# Patient Record
Sex: MALE | Race: WHITE | NOT HISPANIC OR LATINO | Employment: FULL TIME | URBAN - METROPOLITAN AREA
[De-identification: names, ages, dates, MRNs, and addresses within clinical notes are randomized per-mention and may not be internally consistent; named-entity substitution may affect disease eponyms.]

---

## 2020-11-20 ENCOUNTER — OFFICE VISIT (OUTPATIENT)
Dept: OBGYN CLINIC | Facility: CLINIC | Age: 45
End: 2020-11-20
Payer: COMMERCIAL

## 2020-11-20 VITALS
BODY MASS INDEX: 35.78 KG/M2 | WEIGHT: 270 LBS | TEMPERATURE: 98.6 F | HEART RATE: 76 BPM | HEIGHT: 73 IN | SYSTOLIC BLOOD PRESSURE: 132 MMHG | DIASTOLIC BLOOD PRESSURE: 84 MMHG

## 2020-11-20 DIAGNOSIS — G56.01 CARPAL TUNNEL SYNDROME ON RIGHT: ICD-10-CM

## 2020-11-20 DIAGNOSIS — S46.911A STRAIN OF RIGHT SHOULDER, INITIAL ENCOUNTER: Primary | ICD-10-CM

## 2020-11-20 DIAGNOSIS — M25.511 RIGHT SHOULDER PAIN, UNSPECIFIED CHRONICITY: ICD-10-CM

## 2020-11-20 PROCEDURE — 99203 OFFICE O/P NEW LOW 30 MIN: CPT | Performed by: ORTHOPAEDIC SURGERY

## 2020-11-20 RX ORDER — METOCLOPRAMIDE 10 MG/1
TABLET ORAL
COMMUNITY
Start: 2020-06-30

## 2020-11-20 RX ORDER — NAPROXEN 500 MG/1
500 TABLET ORAL 2 TIMES DAILY WITH MEALS
Qty: 28 TABLET | Refills: 0 | Status: SHIPPED | OUTPATIENT
Start: 2020-11-20

## 2020-11-20 RX ORDER — AMITRIPTYLINE HYDROCHLORIDE 10 MG/1
TABLET, FILM COATED ORAL
COMMUNITY
Start: 2020-06-30

## 2020-11-20 RX ORDER — TRAMADOL HYDROCHLORIDE 50 MG/1
50 TABLET ORAL EVERY 4 HOURS PRN
COMMUNITY
Start: 2020-11-11

## 2021-11-07 PROCEDURE — 93005 ELECTROCARDIOGRAM TRACING: CPT

## 2021-11-07 PROCEDURE — 99284 EMERGENCY DEPT VISIT MOD MDM: CPT

## 2021-11-08 ENCOUNTER — HOSPITAL ENCOUNTER (EMERGENCY)
Facility: HOSPITAL | Age: 46
Discharge: HOME | End: 2021-11-08
Attending: EMERGENCY MEDICINE
Payer: COMMERCIAL

## 2021-11-08 ENCOUNTER — APPOINTMENT (EMERGENCY)
Dept: CT IMAGING | Facility: HOSPITAL | Age: 46
End: 2021-11-08
Payer: COMMERCIAL

## 2021-11-08 ENCOUNTER — APPOINTMENT (EMERGENCY)
Dept: RADIOLOGY | Facility: HOSPITAL | Age: 46
End: 2021-11-08
Payer: COMMERCIAL

## 2021-11-08 VITALS
TEMPERATURE: 98.4 F | SYSTOLIC BLOOD PRESSURE: 126 MMHG | WEIGHT: 256 LBS | DIASTOLIC BLOOD PRESSURE: 60 MMHG | BODY MASS INDEX: 34.67 KG/M2 | OXYGEN SATURATION: 94 % | HEART RATE: 72 BPM | RESPIRATION RATE: 20 BRPM | HEIGHT: 72 IN

## 2021-11-08 DIAGNOSIS — B34.9 VIRAL SYNDROME: ICD-10-CM

## 2021-11-08 DIAGNOSIS — E87.6 HYPOKALEMIA: ICD-10-CM

## 2021-11-08 DIAGNOSIS — R11.10 VOMITING: Primary | ICD-10-CM

## 2021-11-08 LAB
ALBUMIN SERPL BCP-MCNC: 3.4 G/DL (ref 3.5–5)
ALP SERPL-CCNC: 64 U/L (ref 46–116)
ALT SERPL W P-5'-P-CCNC: 31 U/L (ref 12–78)
ANION GAP SERPL CALCULATED.3IONS-SCNC: 7 MMOL/L (ref 4–13)
ANION GAP SERPL CALCULATED.3IONS-SCNC: 9 MMOL/L (ref 4–13)
APTT PPP: 28 SECONDS (ref 23–37)
AST SERPL W P-5'-P-CCNC: 12 U/L (ref 5–45)
BASOPHILS # BLD AUTO: 0.01 THOUSANDS/ΜL (ref 0–0.1)
BASOPHILS NFR BLD AUTO: 0 % (ref 0–1)
BILIRUB SERPL-MCNC: 1.1 MG/DL (ref 0.2–1)
BUN SERPL-MCNC: 17 MG/DL (ref 5–25)
BUN SERPL-MCNC: 18 MG/DL (ref 5–25)
CALCIUM ALBUM COR SERPL-MCNC: 8.8 MG/DL (ref 8.3–10.1)
CALCIUM SERPL-MCNC: 7.2 MG/DL (ref 8.3–10.1)
CALCIUM SERPL-MCNC: 8.3 MG/DL (ref 8.3–10.1)
CHLORIDE SERPL-SCNC: 100 MMOL/L (ref 100–108)
CHLORIDE SERPL-SCNC: 96 MMOL/L (ref 100–108)
CK SERPL-CCNC: 79 U/L (ref 39–308)
CO2 SERPL-SCNC: 28 MMOL/L (ref 21–32)
CO2 SERPL-SCNC: 30 MMOL/L (ref 21–32)
CREAT SERPL-MCNC: 0.88 MG/DL (ref 0.6–1.3)
CREAT SERPL-MCNC: 0.95 MG/DL (ref 0.6–1.3)
EOSINOPHIL # BLD AUTO: 0.02 THOUSAND/ΜL (ref 0–0.61)
EOSINOPHIL NFR BLD AUTO: 0 % (ref 0–6)
ERYTHROCYTE [DISTWIDTH] IN BLOOD BY AUTOMATED COUNT: 12.8 % (ref 11.6–15.1)
FLUAV RNA RESP QL NAA+PROBE: NEGATIVE
FLUBV RNA RESP QL NAA+PROBE: NEGATIVE
GFR SERPL CREATININE-BSD FRML MDRD: 102 ML/MIN/1.73SQ M
GFR SERPL CREATININE-BSD FRML MDRD: 94 ML/MIN/1.73SQ M
GLUCOSE SERPL-MCNC: 101 MG/DL (ref 65–140)
GLUCOSE SERPL-MCNC: 91 MG/DL (ref 65–140)
HCT VFR BLD AUTO: 43.5 % (ref 36.5–49.3)
HGB BLD-MCNC: 14.8 G/DL (ref 12–17)
IMM GRANULOCYTES # BLD AUTO: 0.01 THOUSAND/UL (ref 0–0.2)
IMM GRANULOCYTES NFR BLD AUTO: 0 % (ref 0–2)
INR PPP: 1.11 (ref 0.84–1.19)
LIPASE SERPL-CCNC: 66 U/L (ref 73–393)
LYMPHOCYTES # BLD AUTO: 1.29 THOUSANDS/ΜL (ref 0.6–4.47)
LYMPHOCYTES NFR BLD AUTO: 15 % (ref 14–44)
MCH RBC QN AUTO: 30.7 PG (ref 26.8–34.3)
MCHC RBC AUTO-ENTMCNC: 34 G/DL (ref 31.4–37.4)
MCV RBC AUTO: 90 FL (ref 82–98)
MONOCYTES # BLD AUTO: 0.62 THOUSAND/ΜL (ref 0.17–1.22)
MONOCYTES NFR BLD AUTO: 7 % (ref 4–12)
NEUTROPHILS # BLD AUTO: 6.79 THOUSANDS/ΜL (ref 1.85–7.62)
NEUTS SEG NFR BLD AUTO: 78 % (ref 43–75)
PLATELET # BLD AUTO: 256 THOUSANDS/UL (ref 149–390)
PMV BLD AUTO: 9.7 FL (ref 8.9–12.7)
POTASSIUM SERPL-SCNC: 2.5 MMOL/L (ref 3.5–5.3)
POTASSIUM SERPL-SCNC: 3.8 MMOL/L (ref 3.5–5.3)
PROT SERPL-MCNC: 7.8 G/DL (ref 6.4–8.2)
PROTHROMBIN TIME: 14.2 SECONDS (ref 11.6–14.5)
RBC # BLD AUTO: 4.82 MILLION/UL (ref 3.88–5.62)
RSV RNA RESP QL NAA+PROBE: NEGATIVE
SARS-COV-2 RNA RESP QL NAA+PROBE: NEGATIVE
SODIUM SERPL-SCNC: 135 MMOL/L (ref 136–145)
SODIUM SERPL-SCNC: 135 MMOL/L (ref 136–145)
TROPONIN I SERPL-MCNC: <0.02 NG/ML
WBC # BLD AUTO: 8.74 THOUSAND/UL (ref 4.31–10.16)

## 2021-11-08 PROCEDURE — 80053 COMPREHEN METABOLIC PANEL: CPT | Performed by: EMERGENCY MEDICINE

## 2021-11-08 PROCEDURE — 82550 ASSAY OF CK (CPK): CPT | Performed by: EMERGENCY MEDICINE

## 2021-11-08 PROCEDURE — 99285 EMERGENCY DEPT VISIT HI MDM: CPT | Performed by: EMERGENCY MEDICINE

## 2021-11-08 PROCEDURE — 0241U HB NFCT DS VIR RESP RNA 4 TRGT: CPT | Performed by: EMERGENCY MEDICINE

## 2021-11-08 PROCEDURE — 85025 COMPLETE CBC W/AUTO DIFF WBC: CPT | Performed by: EMERGENCY MEDICINE

## 2021-11-08 PROCEDURE — 96366 THER/PROPH/DIAG IV INF ADDON: CPT

## 2021-11-08 PROCEDURE — 96375 TX/PRO/DX INJ NEW DRUG ADDON: CPT

## 2021-11-08 PROCEDURE — 85730 THROMBOPLASTIN TIME PARTIAL: CPT | Performed by: EMERGENCY MEDICINE

## 2021-11-08 PROCEDURE — 93005 ELECTROCARDIOGRAM TRACING: CPT

## 2021-11-08 PROCEDURE — 74177 CT ABD & PELVIS W/CONTRAST: CPT

## 2021-11-08 PROCEDURE — 83690 ASSAY OF LIPASE: CPT | Performed by: EMERGENCY MEDICINE

## 2021-11-08 PROCEDURE — 96365 THER/PROPH/DIAG IV INF INIT: CPT

## 2021-11-08 PROCEDURE — 96361 HYDRATE IV INFUSION ADD-ON: CPT

## 2021-11-08 PROCEDURE — 36415 COLL VENOUS BLD VENIPUNCTURE: CPT | Performed by: EMERGENCY MEDICINE

## 2021-11-08 PROCEDURE — 80048 BASIC METABOLIC PNL TOTAL CA: CPT | Performed by: EMERGENCY MEDICINE

## 2021-11-08 PROCEDURE — 96368 THER/DIAG CONCURRENT INF: CPT

## 2021-11-08 PROCEDURE — 71045 X-RAY EXAM CHEST 1 VIEW: CPT

## 2021-11-08 PROCEDURE — 85610 PROTHROMBIN TIME: CPT | Performed by: EMERGENCY MEDICINE

## 2021-11-08 PROCEDURE — 84484 ASSAY OF TROPONIN QUANT: CPT | Performed by: EMERGENCY MEDICINE

## 2021-11-08 RX ORDER — MAGNESIUM SULFATE HEPTAHYDRATE 40 MG/ML
2 INJECTION, SOLUTION INTRAVENOUS ONCE
Status: COMPLETED | OUTPATIENT
Start: 2021-11-08 | End: 2021-11-08

## 2021-11-08 RX ORDER — POTASSIUM CHLORIDE 14.9 MG/ML
20 INJECTION INTRAVENOUS ONCE
Status: COMPLETED | OUTPATIENT
Start: 2021-11-08 | End: 2021-11-08

## 2021-11-08 RX ORDER — KETOROLAC TROMETHAMINE 30 MG/ML
15 INJECTION, SOLUTION INTRAMUSCULAR; INTRAVENOUS ONCE
Status: COMPLETED | OUTPATIENT
Start: 2021-11-08 | End: 2021-11-08

## 2021-11-08 RX ORDER — ONDANSETRON 2 MG/ML
4 INJECTION INTRAMUSCULAR; INTRAVENOUS ONCE
Status: COMPLETED | OUTPATIENT
Start: 2021-11-08 | End: 2021-11-08

## 2021-11-08 RX ORDER — POTASSIUM CHLORIDE 20 MEQ/1
40 TABLET, EXTENDED RELEASE ORAL ONCE
Status: COMPLETED | OUTPATIENT
Start: 2021-11-08 | End: 2021-11-08

## 2021-11-08 RX ORDER — ONDANSETRON 4 MG/1
4 TABLET, ORALLY DISINTEGRATING ORAL EVERY 6 HOURS PRN
Qty: 20 TABLET | Refills: 0 | Status: SHIPPED | OUTPATIENT
Start: 2021-11-08

## 2021-11-08 RX ORDER — MAGNESIUM HYDROXIDE/ALUMINUM HYDROXICE/SIMETHICONE 120; 1200; 1200 MG/30ML; MG/30ML; MG/30ML
30 SUSPENSION ORAL ONCE
Status: COMPLETED | OUTPATIENT
Start: 2021-11-08 | End: 2021-11-08

## 2021-11-08 RX ADMIN — KETOROLAC TROMETHAMINE 15 MG: 30 INJECTION, SOLUTION INTRAMUSCULAR; INTRAVENOUS at 00:36

## 2021-11-08 RX ADMIN — MAGNESIUM SULFATE HEPTAHYDRATE 2 G: 40 INJECTION, SOLUTION INTRAVENOUS at 02:11

## 2021-11-08 RX ADMIN — IOHEXOL 100 ML: 350 INJECTION, SOLUTION INTRAVENOUS at 03:07

## 2021-11-08 RX ADMIN — ONDANSETRON 4 MG: 2 INJECTION INTRAMUSCULAR; INTRAVENOUS at 00:35

## 2021-11-08 RX ADMIN — SODIUM CHLORIDE 1000 ML: 0.9 INJECTION, SOLUTION INTRAVENOUS at 00:25

## 2021-11-08 RX ADMIN — ALUMINUM HYDROXIDE, MAGNESIUM HYDROXIDE, AND SIMETHICONE 30 ML: 200; 200; 20 SUSPENSION ORAL at 00:35

## 2021-11-08 RX ADMIN — POTASSIUM CHLORIDE 40 MEQ: 1500 TABLET, EXTENDED RELEASE ORAL at 02:15

## 2021-11-08 RX ADMIN — POTASSIUM CHLORIDE 20 MEQ: 14.9 INJECTION, SOLUTION INTRAVENOUS at 02:15

## 2021-11-08 RX ADMIN — FAMOTIDINE 20 MG: 10 INJECTION INTRAVENOUS at 00:36

## 2021-11-12 LAB
ATRIAL RATE: 100 BPM
ATRIAL RATE: 87 BPM
P AXIS: 49 DEGREES
P AXIS: 53 DEGREES
PR INTERVAL: 134 MS
PR INTERVAL: 144 MS
QRS AXIS: 44 DEGREES
QRS AXIS: 46 DEGREES
QRSD INTERVAL: 82 MS
QRSD INTERVAL: 84 MS
QT INTERVAL: 330 MS
QT INTERVAL: 350 MS
QTC INTERVAL: 421 MS
QTC INTERVAL: 425 MS
T WAVE AXIS: 16 DEGREES
T WAVE AXIS: 4 DEGREES
VENTRICULAR RATE: 100 BPM
VENTRICULAR RATE: 87 BPM

## 2021-11-12 PROCEDURE — 93010 ELECTROCARDIOGRAM REPORT: CPT | Performed by: INTERNAL MEDICINE

## 2021-12-27 ENCOUNTER — HOSPITAL ENCOUNTER (EMERGENCY)
Facility: HOSPITAL | Age: 46
Discharge: HOME | End: 2021-12-27
Attending: EMERGENCY MEDICINE | Admitting: EMERGENCY MEDICINE
Payer: COMMERCIAL

## 2021-12-27 VITALS
RESPIRATION RATE: 20 BRPM | WEIGHT: 260 LBS | SYSTOLIC BLOOD PRESSURE: 133 MMHG | DIASTOLIC BLOOD PRESSURE: 91 MMHG | HEART RATE: 104 BPM | BODY MASS INDEX: 35.26 KG/M2 | TEMPERATURE: 98.9 F | OXYGEN SATURATION: 97 %

## 2021-12-27 DIAGNOSIS — J02.0 STREP PHARYNGITIS: Primary | ICD-10-CM

## 2021-12-27 LAB — S PYO DNA THROAT QL NAA+PROBE: NORMAL

## 2021-12-27 PROCEDURE — 87651 STREP A DNA AMP PROBE: CPT | Performed by: PHYSICIAN ASSISTANT

## 2021-12-27 PROCEDURE — 99283 EMERGENCY DEPT VISIT LOW MDM: CPT

## 2021-12-27 PROCEDURE — 99284 EMERGENCY DEPT VISIT MOD MDM: CPT | Performed by: PHYSICIAN ASSISTANT

## 2021-12-27 RX ORDER — AMOXICILLIN 250 MG/5ML
500 POWDER, FOR SUSPENSION ORAL 2 TIMES DAILY
Qty: 200 ML | Refills: 0 | Status: SHIPPED | OUTPATIENT
Start: 2021-12-27 | End: 2022-01-06

## 2021-12-27 RX ADMIN — DEXAMETHASONE SODIUM PHOSPHATE 10 MG: 10 INJECTION, SOLUTION INTRAMUSCULAR; INTRAVENOUS at 10:29

## 2021-12-27 NOTE — ED PROVIDER NOTES
History  Chief Complaint   Patient presents with    Fever - 9 weeks to 74 years     pt had fevers at home since [address] ever     49 yo male w hx of HTN presents for evaluation of sore throat and fever x 3 days  Significant pain w/ swallowing  No cough, congestion, N/V/D  Did receive COVID vaccination and booster dose  No ill contacts at home  Prior to Admission Medications   Prescriptions Last Dose Informant Patient Reported? Taking?   amitriptyline (ELAVIL) 10 mg tablet   Yes No   Sig: TAKE TWO TABLETS BY MOUTH AT BEDTIME FOR YOUR MOOD   diclofenac sodium (VOLTAREN) 50 mg EC tablet   Yes No   Sig: TAKE ONE TABLET BY MOUTH TWICE DAILY AS NEEDED , FOR PAIN AND INFLAMMATION  metoclopramide (REGLAN) 10 mg tablet   Yes No   Sig: TAKE ONE TABLET BY MOUTH TWICE DAILY AS NEEDED , FOR ACID REFLUX    naproxen (NAPROSYN) 500 mg tablet   No No   Sig: Take 1 tablet (500 mg total) by mouth 2 (two) times a day with meals   ondansetron (Zofran ODT) 4 mg disintegrating tablet   No No   Sig: Take 1 tablet (4 mg total) by mouth every 6 (six) hours as needed for nausea or vomiting   traMADol (ULTRAM) 50 mg tablet   Yes No   Sig: Take 50 mg by mouth every 4 (four) hours as needed      Facility-Administered Medications: None       Past Medical History:   Diagnosis Date    Hypertension     Migraines        Past Surgical History:   Procedure Laterality Date    REVISION OF SCAR ON FACE/HEAD         No family history on file  I have reviewed and agree with the history as documented      E-Cigarette/Vaping    E-Cigarette Use Current Every Day User      E-Cigarette/Vaping Substances    Nicotine No     THC No     CBD No     Flavoring No     Other No     Unknown No      Social History     Tobacco Use    Smoking status: Never Smoker    Smokeless tobacco: Never Used   Vaping Use    Vaping Use: Every day   Substance Use Topics    Alcohol use: Yes     Comment: social     Drug use: Never       Review of Systems Constitutional: Positive for chills, diaphoresis and fever  HENT: Positive for sore throat  Negative for congestion and rhinorrhea  Eyes: Negative for visual disturbance  Respiratory: Negative for cough and shortness of breath  Cardiovascular: Negative for chest pain and palpitations  Gastrointestinal: Negative for abdominal pain, diarrhea, nausea and vomiting  Genitourinary: Negative for dysuria, flank pain and frequency  Musculoskeletal: Negative for arthralgias and myalgias  Skin: Negative for color change, rash and wound  Allergic/Immunologic: Negative for immunocompromised state  Neurological: Positive for weakness  Negative for dizziness and light-headedness  Hematological: Does not bruise/bleed easily  Psychiatric/Behavioral: Negative for confusion  The patient is not nervous/anxious  Physical Exam  Physical Exam  Vitals and nursing note reviewed  Constitutional:       Appearance: He is well-developed  HENT:      Head: Normocephalic and atraumatic  Mouth/Throat:      Mouth: Mucous membranes are moist       Pharynx: Uvula midline  Pharyngeal swelling, oropharyngeal exudate and uvula swelling present  Tonsils: Tonsillar exudate present  No tonsillar abscesses  3+ on the right  3+ on the left  Eyes:      Conjunctiva/sclera: Conjunctivae normal    Cardiovascular:      Rate and Rhythm: Normal rate and regular rhythm  Heart sounds: No murmur heard  Pulmonary:      Effort: Pulmonary effort is normal  No respiratory distress  Breath sounds: Normal breath sounds  Abdominal:      Palpations: Abdomen is soft  Tenderness: There is no abdominal tenderness  Musculoskeletal:      Cervical back: Neck supple  Lymphadenopathy:      Cervical: Cervical adenopathy present  Right cervical: Superficial cervical adenopathy present  Left cervical: Superficial cervical adenopathy present  Skin:     General: Skin is warm and dry     Neurological: Mental Status: He is alert  Vital Signs  ED Triage Vitals [12/27/21 1020]   Temperature Pulse Respirations Blood Pressure SpO2   98 9 °F (37 2 °C) 104 20 133/91 97 %      Temp src Heart Rate Source Patient Position - Orthostatic VS BP Location FiO2 (%)   -- Monitor -- -- --      Pain Score       No Pain           Vitals:    12/27/21 1020   BP: 133/91   Pulse: 104         Visual Acuity      ED Medications  Medications   dexamethasone oral liquid 10 mg 1 mL (10 mg Oral Given 12/27/21 1029)       Diagnostic Studies  Results Reviewed     Procedure Component Value Units Date/Time    Strep A PCR [[id number]] Collected: 12/27/21 1029    Lab Status: No result Specimen: Throat                  No orders to display              Procedures  Procedures         ED Course                                             MDM  Number of Diagnoses or Management Options  Strep pharyngitis: new and requires workup  Diagnosis management comments: Clinically appears to be strep pharyngitis, will give decadron and antibiotics  Mildly tachycardic however tolerating PO        Amount and/or Complexity of Data Reviewed  Tests in the medicine section of CPT®: ordered  Review and summarize past medical records: yes        Disposition  Final diagnoses:   Strep pharyngitis     Time reflects when diagnosis was documented in both MDM as applicable and the Disposition within this note     Time User Action Codes Description Comment    12/27/2021 10:25 AM Gabino Avina Add [J02 0] Strep pharyngitis       ED Disposition     ED Disposition Condition Date/Time Comment    Discharge Stable Mon Dec 27, 2021 10:27 AM Yeison Gastelum discharge to home/self care              Follow-up Information     Follow up With Specialties Details Why Contact Info Additional Information     [address] Emergency Department Emergency Medicine  If symptoms worsen [address] [id number] [phone] [address] [place] [address] [facility] Emergency Department, [address] [facility] [address]          Patient's Medications   Discharge Prescriptions    AMOXICILLIN (AMOXIL) 250 MG/5 ML ORAL SUSPENSION    Take 10 mL (500 mg total) by mouth 2 (two) times a day for 10 days       Start Date: 12/27/2021End Date: 1/6/2022       Order Dose: 500 mg       Quantity: 200 mL    Refills: 0       No discharge procedures on file      PDMP Review     None          ED Provider  Electronically Signed by           Joanna Moon PA-C  12/27/21 4470

## 2023-01-27 ENCOUNTER — APPOINTMENT (EMERGENCY)
Dept: CT IMAGING | Facility: HOSPITAL | Age: 48
End: 2023-01-27

## 2023-01-27 ENCOUNTER — HOSPITAL ENCOUNTER (EMERGENCY)
Facility: HOSPITAL | Age: 48
Discharge: HOME | End: 2023-01-27
Attending: EMERGENCY MEDICINE

## 2023-01-27 ENCOUNTER — APPOINTMENT (EMERGENCY)
Dept: RADIOLOGY | Facility: HOSPITAL | Age: 48
End: 2023-01-27

## 2023-01-27 VITALS
HEART RATE: 84 BPM | HEIGHT: 72 IN | OXYGEN SATURATION: 97 % | WEIGHT: 250 LBS | DIASTOLIC BLOOD PRESSURE: 73 MMHG | TEMPERATURE: 98.5 F | RESPIRATION RATE: 20 BRPM | BODY MASS INDEX: 33.86 KG/M2 | SYSTOLIC BLOOD PRESSURE: 138 MMHG

## 2023-01-27 DIAGNOSIS — R11.2 NAUSEA AND VOMITING: ICD-10-CM

## 2023-01-27 DIAGNOSIS — J12.9 VIRAL PNEUMONIA: ICD-10-CM

## 2023-01-27 DIAGNOSIS — E87.6 HYPOKALEMIA: ICD-10-CM

## 2023-01-27 DIAGNOSIS — U07.1 COVID-19: Primary | ICD-10-CM

## 2023-01-27 LAB
2HR DELTA HS TROPONIN: 0 NG/L
ALBUMIN SERPL BCP-MCNC: 3.4 G/DL (ref 3.5–5)
ALP SERPL-CCNC: 57 U/L (ref 46–116)
ALT SERPL W P-5'-P-CCNC: 30 U/L (ref 12–78)
ANION GAP SERPL CALCULATED.3IONS-SCNC: 11 MMOL/L (ref 4–13)
AST SERPL W P-5'-P-CCNC: 23 U/L (ref 5–45)
BASOPHILS # BLD AUTO: 0.01 THOUSANDS/ÂΜL (ref 0–0.1)
BASOPHILS NFR BLD AUTO: 0 % (ref 0–1)
BILIRUB SERPL-MCNC: 0.4 MG/DL (ref 0.2–1)
BUN SERPL-MCNC: 14 MG/DL (ref 5–25)
CALCIUM ALBUM COR SERPL-MCNC: 9.1 MG/DL (ref 8.3–10.1)
CALCIUM SERPL-MCNC: 8.6 MG/DL (ref 8.3–10.1)
CARDIAC TROPONIN I PNL SERPL HS: 7 NG/L
CARDIAC TROPONIN I PNL SERPL HS: 7 NG/L
CHLORIDE SERPL-SCNC: 97 MMOL/L (ref 96–108)
CO2 SERPL-SCNC: 26 MMOL/L (ref 21–32)
CREAT SERPL-MCNC: 0.96 MG/DL (ref 0.6–1.3)
D DIMER PPP FEU-MCNC: 0.54 UG/ML FEU
EOSINOPHIL # BLD AUTO: 0 THOUSAND/ÂΜL (ref 0–0.61)
EOSINOPHIL NFR BLD AUTO: 0 % (ref 0–6)
ERYTHROCYTE [DISTWIDTH] IN BLOOD BY AUTOMATED COUNT: 12.4 % (ref 11.6–15.1)
FLUAV RNA RESP QL NAA+PROBE: NEGATIVE
FLUBV RNA RESP QL NAA+PROBE: NEGATIVE
GFR SERPL CREATININE-BSD FRML MDRD: 92 ML/MIN/1.73SQ M
GLUCOSE SERPL-MCNC: 97 MG/DL (ref 65–140)
HCT VFR BLD AUTO: 38.8 % (ref 36.5–49.3)
HGB BLD-MCNC: 13.1 G/DL (ref 12–17)
IMM GRANULOCYTES # BLD AUTO: 0.02 THOUSAND/UL (ref 0–0.2)
IMM GRANULOCYTES NFR BLD AUTO: 0 % (ref 0–2)
LYMPHOCYTES # BLD AUTO: 0.38 THOUSANDS/ÂΜL (ref 0.6–4.47)
LYMPHOCYTES NFR BLD AUTO: 7 % (ref 14–44)
MCH RBC QN AUTO: 30.7 PG (ref 26.8–34.3)
MCHC RBC AUTO-ENTMCNC: 33.8 G/DL (ref 31.4–37.4)
MCV RBC AUTO: 91 FL (ref 82–98)
MONOCYTES # BLD AUTO: 0.61 THOUSAND/ÂΜL (ref 0.17–1.22)
MONOCYTES NFR BLD AUTO: 11 % (ref 4–12)
NEUTROPHILS # BLD AUTO: 4.62 THOUSANDS/ÂΜL (ref 1.85–7.62)
NEUTS SEG NFR BLD AUTO: 82 % (ref 43–75)
NRBC BLD AUTO-RTO: 0 /100 WBCS
PLATELET # BLD AUTO: 260 THOUSANDS/UL (ref 149–390)
PMV BLD AUTO: 9.9 FL (ref 8.9–12.7)
POTASSIUM SERPL-SCNC: 2.8 MMOL/L (ref 3.5–5.3)
PROT SERPL-MCNC: 8.1 G/DL (ref 6.4–8.4)
RBC # BLD AUTO: 4.27 MILLION/UL (ref 3.88–5.62)
RSV RNA RESP QL NAA+PROBE: NEGATIVE
SARS-COV-2 RNA RESP QL NAA+PROBE: POSITIVE
SODIUM SERPL-SCNC: 134 MMOL/L (ref 135–147)
WBC # BLD AUTO: 5.64 THOUSAND/UL (ref 4.31–10.16)

## 2023-01-27 RX ORDER — ONDANSETRON 2 MG/ML
1 INJECTION INTRAMUSCULAR; INTRAVENOUS ONCE
Status: COMPLETED | OUTPATIENT
Start: 2023-01-27 | End: 2023-01-27

## 2023-01-27 RX ORDER — POTASSIUM CHLORIDE 14.9 MG/ML
20 INJECTION INTRAVENOUS ONCE
Status: COMPLETED | OUTPATIENT
Start: 2023-01-27 | End: 2023-01-27

## 2023-01-27 RX ORDER — MAGNESIUM SULFATE HEPTAHYDRATE 40 MG/ML
2 INJECTION, SOLUTION INTRAVENOUS ONCE
Status: COMPLETED | OUTPATIENT
Start: 2023-01-27 | End: 2023-01-27

## 2023-01-27 RX ORDER — KETOROLAC TROMETHAMINE 30 MG/ML
30 INJECTION, SOLUTION INTRAMUSCULAR; INTRAVENOUS ONCE
Status: COMPLETED | OUTPATIENT
Start: 2023-01-27 | End: 2023-01-27

## 2023-01-27 RX ORDER — POTASSIUM CHLORIDE 20 MEQ/1
40 TABLET, EXTENDED RELEASE ORAL ONCE
Status: COMPLETED | OUTPATIENT
Start: 2023-01-27 | End: 2023-01-27

## 2023-01-27 RX ORDER — ONDANSETRON 4 MG/1
4 TABLET, FILM COATED ORAL EVERY 6 HOURS
Qty: 12 TABLET | Refills: 0 | Status: SHIPPED | OUTPATIENT
Start: 2023-01-27

## 2023-01-27 RX ADMIN — IOHEXOL 100 ML: 350 INJECTION, SOLUTION INTRAVENOUS at 21:21

## 2023-01-27 RX ADMIN — SODIUM CHLORIDE 1000 ML: 0.9 INJECTION, SOLUTION INTRAVENOUS at 20:34

## 2023-01-27 RX ADMIN — KETOROLAC TROMETHAMINE 30 MG: 30 INJECTION, SOLUTION INTRAMUSCULAR at 20:41

## 2023-01-27 RX ADMIN — MAGNESIUM SULFATE HEPTAHYDRATE 2 G: 2 INJECTION, SOLUTION INTRAVENOUS at 20:41

## 2023-01-27 RX ADMIN — POTASSIUM CHLORIDE 40 MEQ: 1500 TABLET, EXTENDED RELEASE ORAL at 20:41

## 2023-01-27 RX ADMIN — POTASSIUM CHLORIDE 20 MEQ: 14.9 INJECTION, SOLUTION INTRAVENOUS at 20:41

## 2023-01-28 LAB
ATRIAL RATE: 99 BPM
P AXIS: 8 DEGREES
PR INTERVAL: 148 MS
QRS AXIS: 16 DEGREES
QRSD INTERVAL: 80 MS
QT INTERVAL: 334 MS
QTC INTERVAL: 428 MS
T WAVE AXIS: 28 DEGREES
VENTRICULAR RATE: 99 BPM

## 2023-01-28 NOTE — DISCHARGE INSTRUCTIONS
Use OTC pain medications such as ibuprofen or tylenol every 4-6 hours as needed for pain and fever  Can alternate every 3 as needed  Take 4 mg zofran under the tongue every 6 hours for nausea  Take 2000 IU vitamin D3 daily  Get pulse oximeter for at home  Increase foods high in potassium  Stay hydrated with electrolyte drinks such as gatorade and pedialyte  Eat bland foods  Rest as much as possible  Isolate for 5 days after symptoms started and mask for 5 days after that    Follow up with your PCP for re-evaluation of symptoms if they do not resolve in the next week  Return to ED if you begin to experience chest pain especially if it radiates into arm or jaw, shortness of breath, palpitations, difficulty breathing, coughing up blood or green/yellow mucus, cant stop vomiting, feeling dizzy or weak like you are going to faint, visual changes, intense headache, difficulty speaking, difficulty walking

## 2023-01-28 NOTE — ED PROVIDER NOTES
History  Chief Complaint   Patient presents with   • Cold Like Symptoms     Pt states that for 2 days he has had fever, chill, dizziness, body aches  EMS gave pt 975mg tylenol and 4mg IV zofran  Pt states that he has been around people that tested positive for covid but did not test himself at home  EMS states pt oxygen was in the 80's and placed pt on 2L NC     This is a 49 y/o male with PMH HTN, migraines, GERD, TBI who presents to the ER today for chest pressure, shortness of breath, cough, nausea, fevers, headaches, body aches over the past 2 days  Patient states he was exposed to many covid + people at work  He states he feels like an "elephant is sitting on his chest" and he is having difficulty taking deep breaths  States he is coughing up some mucus but not much  Admits to feeling nauseous and vomiting yesterday and today, now just dry heaving, unable to keep food or drinks down  Urinating less  He denies any heart palpitations, blood in cough, vomit or stool, abdominal pain, changes in urination or bowel movements  No recent travel, hospitalizations or surgeries  No history of cancer diagnoses in past or history of blood clots  History provided by:  Patient   used: No    URI  Presenting symptoms: congestion, cough, fever and rhinorrhea    Associated symptoms: headaches and myalgias        Prior to Admission Medications   Prescriptions Last Dose Informant Patient Reported? Taking?   amitriptyline (ELAVIL) 10 mg tablet   Yes No   Sig: TAKE TWO TABLETS BY MOUTH AT BEDTIME FOR YOUR MOOD   diclofenac sodium (VOLTAREN) 50 mg EC tablet   Yes No   Sig: TAKE ONE TABLET BY MOUTH TWICE DAILY AS NEEDED , FOR PAIN AND INFLAMMATION     metoclopramide (REGLAN) 10 mg tablet   Yes No   Sig: TAKE ONE TABLET BY MOUTH TWICE DAILY AS NEEDED , FOR ACID REFLUX    naproxen (NAPROSYN) 500 mg tablet   No No   Sig: Take 1 tablet (500 mg total) by mouth 2 (two) times a day with meals   ondansetron (Zofran ODT) 4 mg disintegrating tablet   No No   Sig: Take 1 tablet (4 mg total) by mouth every 6 (six) hours as needed for nausea or vomiting   traMADol (ULTRAM) 50 mg tablet   Yes No   Sig: Take 50 mg by mouth every 4 (four) hours as needed      Facility-Administered Medications: None       Past Medical History:   Diagnosis Date   • Back pain    • Hypertension    • Migraines    • TBI (traumatic brain injury)        Past Surgical History:   Procedure Laterality Date   • REVISION OF SCAR ON FACE/HEAD         History reviewed  No pertinent family history  I have reviewed and agree with the history as documented  E-Cigarette/Vaping   • E-Cigarette Use Current Every Day User      E-Cigarette/Vaping Substances   • Nicotine No    • THC No    • CBD No    • Flavoring No    • Other No    • Unknown No      Social History     Tobacco Use   • Smoking status: Never   • Smokeless tobacco: Never   Vaping Use   • Vaping Use: Every day   Substance Use Topics   • Alcohol use: Yes     Comment: social    • Drug use: Never       Review of Systems   Constitutional: Positive for fever  Negative for chills  HENT: Positive for congestion and rhinorrhea  Respiratory: Positive for cough  Negative for chest tightness and shortness of breath  Cardiovascular: Negative for chest pain  Gastrointestinal: Positive for nausea and vomiting  Musculoskeletal: Positive for myalgias  Skin: Negative for color change  Neurological: Positive for headaches  Psychiatric/Behavioral: Negative for behavioral problems and sleep disturbance  All other systems reviewed and are negative  Physical Exam  Physical Exam  Vitals and nursing note reviewed  Constitutional:       General: He is awake  Appearance: Normal appearance  He is well-developed  HENT:      Head: Normocephalic and atraumatic        Right Ear: External ear normal       Left Ear: External ear normal       Nose: Nose normal       Mouth/Throat:      Mouth: Mucous membranes are moist       Pharynx: Oropharynx is clear  No oropharyngeal exudate or posterior oropharyngeal erythema  Eyes:      General: No scleral icterus  Extraocular Movements: Extraocular movements intact  Cardiovascular:      Rate and Rhythm: Normal rate and regular rhythm  Heart sounds: Normal heart sounds, S1 normal and S2 normal  No murmur heard  No gallop  Pulmonary:      Breath sounds: Normal breath sounds  No decreased breath sounds, wheezing, rhonchi or rales  Comments: O2 sats maintained > 95% on RA with only decreases while sleeping  No tachypnea/resp distress  Abdominal:      General: Abdomen is flat  Palpations: Abdomen is soft  Tenderness: There is no abdominal tenderness  There is no guarding or rebound  Musculoskeletal:         General: Normal range of motion  Cervical back: Normal range of motion  Skin:     General: Skin is warm and dry  Neurological:      General: No focal deficit present  Mental Status: He is alert  Psychiatric:         Attention and Perception: Attention and perception normal          Mood and Affect: Mood normal          Behavior: Behavior normal  Behavior is cooperative           Vital Signs  ED Triage Vitals   Temperature Pulse Respirations Blood Pressure SpO2   01/27/23 1938 01/27/23 1938 01/27/23 1938 01/27/23 1938 01/27/23 1938   98 5 °F (36 9 °C) 101 20 127/67 98 %      Temp Source Heart Rate Source Patient Position - Orthostatic VS BP Location FiO2 (%)   01/27/23 1938 01/27/23 2000 01/27/23 2000 01/27/23 2000 --   Temporal Monitor Lying Right arm       Pain Score       01/27/23 2000       No Pain           Vitals:    01/27/23 2032 01/27/23 2100 01/27/23 2200 01/27/23 2230   BP: 125/68 109/56 96/54 118/58   Pulse: 90 92 81 77   Patient Position - Orthostatic VS: Sitting   Sitting         Visual Acuity      ED Medications  Medications   ondansetron (FOR EMS ONLY) (ZOFRAN) 4 mg/2 mL injection 4 mg (0 mg Does not apply Given to EMS 1/27/23 1959)   magnesium sulfate 2 g/50 mL IVPB (premix) 2 g (0 g Intravenous Stopped 1/27/23 2256)   potassium chloride (K-DUR,KLOR-CON) CR tablet 40 mEq (40 mEq Oral Given 1/27/23 2041)   potassium chloride 20 mEq IVPB (premix) (0 mEq Intravenous Stopped 1/27/23 2256)   ketorolac (TORADOL) injection 30 mg (30 mg Intravenous Given 1/27/23 2041)   sodium chloride 0 9 % bolus 1,000 mL (0 mL Intravenous Stopped 1/27/23 2256)   iohexol (OMNIPAQUE) 350 MG/ML injection (SINGLE-DOSE) 100 mL (100 mL Intravenous Given 1/27/23 2121)       Diagnostic Studies  Results Reviewed     Procedure Component Value Units Date/Time    HS Troponin I 2hr [[id number]]  (Normal) Collected: 01/27/23 2135    Lab Status: Final result Specimen: Blood from Arm, Right Updated: 01/27/23 2207     hs TnI 2hr 7 ng/L      Delta 2hr hsTnI 0 ng/L     D-dimer, quantitative [[id number]]  (Abnormal) Collected: 01/27/23 1945    Lab Status: Final result Specimen: Blood from Arm, Left Updated: 01/27/23 2049     D-Dimer, Quant 0 54 ug/ml FEU     COVID/FLU/RSV [[id number]]  (Abnormal) Collected: 01/27/23 1945    Lab Status: Final result Specimen: Nares from Nose Updated: 01/27/23 2029     SARS-CoV-2 Positive     INFLUENZA A PCR Negative     INFLUENZA B PCR Negative     RSV PCR Negative    Narrative:      FOR PEDIATRIC PATIENTS - copy/paste COVID Guidelines URL to browser: [email or web address]    SARS-CoV-2 assay is a Nucleic Acid Amplification assay intended for the  qualitative detection of nucleic acid from SARS-CoV-2 in nasopharyngeal  swabs  Results are for the presumptive identification of SARS-CoV-2 RNA  Positive results are indicative of infection with SARS-CoV-2, the virus  causing COVID-19, but do not rule out bacterial infection or co-infection  with other viruses   Laboratories within the [place] and its  territories are required to report all positive results to the appropriate  public health authorities  Negative results do not preclude SARS-CoV-2  infection and should not be used as the sole basis for treatment or other  patient management decisions  Negative results must be combined with  clinical observations, patient history, and epidemiological information  This test has not been FDA cleared or approved  This test has been authorized by FDA under an Emergency Use Authorization  (EUA)  This test is only authorized for the duration of time the  declaration that circumstances exist justifying the authorization of the  emergency use of an in vitro diagnostic tests for detection of SARS-CoV-2  virus and/or diagnosis of COVID-19 infection under section 564(b)(1) of  the Act, 21 U  S C  945RIH-3(U)(9), unless the authorization is terminated  or revoked sooner  The test has been validated but independent review by FDA  and CLIA is pending  Test performed using [employer] GeneXpert: This RT-PCR assay targets N2,  a region unique to SARS-CoV-2  A conserved region in the E-gene was chosen  for pan-Sarbecovirus detection which includes SARS-CoV-2  According to CMS-2020-01-R, this platform meets the definition of high-throughput technology      HS Troponin 0hr (reflex protocol) [[id number]]  (Normal) Collected: 01/27/23 1945    Lab Status: Final result Specimen: Blood from Arm, Left Updated: 01/27/23 2015     hs TnI 0hr 7 ng/L     Comprehensive metabolic panel [[id number]]  (Abnormal) Collected: 01/27/23 1945    Lab Status: Final result Specimen: Blood from Arm, Left Updated: 01/27/23 2010     Sodium 134 mmol/L      Potassium 2 8 mmol/L      Chloride 97 mmol/L      CO2 26 mmol/L      ANION GAP 11 mmol/L      BUN 14 mg/dL      Creatinine 0 96 mg/dL      Glucose 97 mg/dL      Calcium 8 6 mg/dL      Corrected Calcium 9 1 mg/dL      AST 23 U/L      ALT 30 U/L      Alkaline Phosphatase 57 U/L      Total Protein 8 1 g/dL      Albumin 3 4 g/dL      Total Bilirubin 0 40 mg/dL      eGFR 92 ml/min/1 73sq m Narrative:      National Kidney Disease Foundation guidelines for Chronic Kidney Disease (CKD):   •  Stage 1 with normal or high GFR (GFR > 90 mL/min/1 73 square meters)  •  Stage 2 Mild CKD (GFR = 60-89 mL/min/1 73 square meters)  •  Stage 3A Moderate CKD (GFR = 45-59 mL/min/1 73 square meters)  •  Stage 3B Moderate CKD (GFR = 30-44 mL/min/1 73 square meters)  •  Stage 4 Severe CKD (GFR = 15-29 mL/min/1 73 square meters)  •  Stage 5 End Stage CKD (GFR <15 mL/min/1 73 square meters)  Note: GFR calculation is accurate only with a steady state creatinine    CBC and differential [[id number]]  (Abnormal) Collected: 01/27/23 1945    Lab Status: Final result Specimen: Blood from Arm, Left Updated: 01/27/23 1952     WBC 5 64 Thousand/uL      RBC 4 27 Million/uL      Hemoglobin 13 1 g/dL      Hematocrit 38 8 %      MCV 91 fL      MCH 30 7 pg      MCHC 33 8 g/dL      RDW 12 4 %      MPV 9 9 fL      Platelets 954 Thousands/uL      nRBC 0 /100 WBCs      Neutrophils Relative 82 %      Immat GRANS % 0 %      Lymphocytes Relative 7 %      Monocytes Relative 11 %      Eosinophils Relative 0 %      Basophils Relative 0 %      Neutrophils Absolute 4 62 Thousands/µL      Immature Grans Absolute 0 02 Thousand/uL      Lymphocytes Absolute 0 38 Thousands/µL      Monocytes Absolute 0 61 Thousand/µL      Eosinophils Absolute 0 00 Thousand/µL      Basophils Absolute 0 01 Thousands/µL                  CTA ED chest PE study   Final Result by Jose Contreras DO (01/27 2225)      No acute pulmonary embolus  Scattered linear and groundglass opacities, which may suggest viral pneumonia in the appropriate clinical setting  The study was marked in [facility] for immediate notification  Workstation performed: [id number]         XR chest 1 view portable   ED Interpretation by Jamal Ramirez PA-C (01/27 2027)   Poor lung volumes but no acute cardiopulmonary disease   No change from 11/8/1 CXR                 Procedures  ECG 12 Lead Documentation Only    Date/Time: 1/27/2023 7:56 PM  Performed by: Carri Crockett PA-C  Authorized by: Carri Crockett PA-C     Indications / Diagnosis:  Chest pain  Previous ECG:     Previous ECG:  Unavailable  Interpretation:     Interpretation: normal    Rate:     ECG rate:  99    ECG rate assessment: normal    Rhythm:     Rhythm: sinus rhythm    Comments:      Normal sinus rhythm with no signs of acute ischemia, ectopy or arrhythmias             ED Course  ED Course as of 01/27/23 2319 Fri Jan 27, 2023 2227 CTA: Scattered linear and groundglass opacities, which may suggest viral pneumonia in the appropriate clinical setting       2303 Patient feeling much better after fluids, zofran, potassium  He is now sitting up in bed, eating a sandwich  Stable for d/c             HEART Risk Score    Flowsheet Row Most Recent Value   Heart Score Risk Calculator    History 0 Filed at: 01/27/2023 2318   ECG 0 Filed at: 01/27/2023 2318   Age 1 Filed at: 01/27/2023 2318   Risk Factors 1 Filed at: 01/27/2023 2318   Troponin 0 Filed at: 01/27/2023 2318   HEART Score 2 Filed at: 01/27/2023 2318                        SBIRT 22yo+    Flowsheet Row Most Recent Value   SBIRT (23 yo +)    In order to provide better care to our patients, we are screening all of our patients for alcohol and drug use  Would it be okay to ask you these screening questions? No Filed at: 01/27/2023 2213   Initial Alcohol Screen: US AUDIT-C     1  How often do you have a drink containing alcohol? 0 Filed at: 01/27/2023 2213   2  How many drinks containing alcohol do you have on a typical day you are drinking? 0 Filed at: 01/27/2023 2213   3a  Male UNDER 65: How often do you have five or more drinks on one occasion? 0 Filed at: 01/27/2023 2213   3b  FEMALE Any Age, or MALE 65+: How often do you have 4 or more drinks on one occassion?  0 Filed at: 01/27/2023 2213   Audit-C Score 0 Filed at: 01/27/2023 2213   AWAIS: How many times in the past year have you    Used an illegal drug or used a prescription medication for non-medical reasons? Never Filed at: 01/27/2023 2213          Wells' Criteria for PE    Flowsheet Row Most Recent Value   Wells' Criteria for PE    Clinical signs and symptoms of DVT 0 Filed at: 01/27/2023 2024   PE is primary diagnosis or equally likely 0 Filed at: 01/27/2023 2024   HR >100 1 5 Filed at: 01/27/2023 2024   Immobilization at least 3 days or Surgery in the previous 4 weeks 0 Filed at: 01/27/2023 2024   Previous, objectively diagnosed PE or DVT 0 Filed at: 01/27/2023 2024   Hemoptysis 0 Filed at: 01/27/2023 2024   Malignancy with treatment within 6 months or palliative 0 Filed at: 01/27/2023 2024   Wells' Criteria Total 1 5 Filed at: 01/27/2023 2024                Medical Decision Making  49 y/o male here with viral symptoms, exposed to covid at work  +chest pain/SOB  Differential diagnosis: ACS, PE, pneumonia, electrolyte abnormality, metabolic dysfunction  Assessment:  COVID-19, hypokalemia, nausea/vomiting, viral pneumonia  Plan: see ED course  Patient was given care instructions for viral syndrome and covid-19  Instructed to follow up with PCP if symptoms dont improve within the next week  He  was given strict return to ER precautions both verbally and in discharge papers  Patient verbalized understanding and agrees with plan  COVID-19: acute illness or injury  Hypokalemia: acute illness or injury  Nausea and vomiting: acute illness or injury  Viral pneumonia: acute illness or injury  Amount and/or Complexity of Data Reviewed  Labs: ordered  Radiology: ordered and independent interpretation performed  Risk  Prescription drug management            Disposition  Final diagnoses:   COVID-19   Hypokalemia   Viral pneumonia   Nausea and vomiting     Time reflects when diagnosis was documented in both MDM as applicable and the Disposition within this note     Time User Action Codes Description Comment    1/27/2023 11:04 PM Larjessicaa Salk Add [U07 1] [id number]     1/27/2023 11:04 PM Larinda Salk Add [E87 6] Hypokalemia     1/27/2023 11:04 PM Larinda Salk Add [J12 9] Viral pneumonia     1/27/2023 11:04 PM Larinda Salk Add [R11 2] Nausea and vomiting       ED Disposition     ED Disposition   Discharge    Condition   Stable    Date/Time   Fri Jan 27, 2023 11:08 PM    Comment   Leafy Peaches discharge to home/self care  Follow-up Information     Follow up With Specialties Details Why Contact Info Additional Information    PCP  Call  As needed       [address] [facility] Emergency Medicine Go to  if you begin to experience chest pain especially if it radiates into arm or jaw, shortness of breath, palpitations, difficulty breathing, coughing up blood or green/yellow mucus, cant stop vomiting, feeling dizzy or weak like you are going to faint, visual changes, intense headache, difficulty speaking, difficulty walking [id number] [facility], [address] [place] [address] [address]          Patient's Medications   Discharge Prescriptions    ONDANSETRON (ZOFRAN) 4 MG TABLET    Take 1 tablet (4 mg total) by mouth every 6 (six) hours       Start Date: 1/27/2023 End Date: --       Order Dose: 4 mg       Quantity: 12 tablet    Refills: 0       No discharge procedures on file      PDMP Review     None          ED Provider  Electronically Signed by           Whitney Hayes PA-C  01/27/23 2639

## 2024-07-06 ENCOUNTER — HOSPITAL ENCOUNTER (EMERGENCY)
Facility: HOSPITAL | Age: 49
Discharge: HOME | End: 2024-07-06
Attending: EMERGENCY MEDICINE
Payer: COMMERCIAL

## 2024-07-06 ENCOUNTER — APPOINTMENT (OUTPATIENT)
Dept: RADIOLOGY | Facility: HOSPITAL | Age: 49
End: 2024-07-06
Payer: COMMERCIAL

## 2024-07-06 VITALS
SYSTOLIC BLOOD PRESSURE: 128 MMHG | DIASTOLIC BLOOD PRESSURE: 79 MMHG | RESPIRATION RATE: 18 BRPM | TEMPERATURE: 98.2 F | HEART RATE: 62 BPM | OXYGEN SATURATION: 97 %

## 2024-07-06 DIAGNOSIS — R11.0 NAUSEA: ICD-10-CM

## 2024-07-06 DIAGNOSIS — J20.9 ACUTE BRONCHITIS: Primary | ICD-10-CM

## 2024-07-06 LAB
ALBUMIN SERPL BCG-MCNC: 4.1 G/DL (ref 3.5–5)
ALP SERPL-CCNC: 48 U/L (ref 34–104)
ALT SERPL W P-5'-P-CCNC: 18 U/L (ref 7–52)
ANION GAP SERPL CALCULATED.3IONS-SCNC: 9 MMOL/L (ref 4–13)
AST SERPL W P-5'-P-CCNC: 15 U/L (ref 13–39)
ATRIAL RATE: 61 BPM
BASOPHILS # BLD AUTO: 0.04 THOUSANDS/ÂΜL (ref 0–0.1)
BASOPHILS NFR BLD AUTO: 0 % (ref 0–1)
BILIRUB SERPL-MCNC: 0.76 MG/DL (ref 0.2–1)
BUN SERPL-MCNC: 16 MG/DL (ref 5–25)
CALCIUM SERPL-MCNC: 9.6 MG/DL (ref 8.4–10.2)
CARDIAC TROPONIN I PNL SERPL HS: 4 NG/L
CHLORIDE SERPL-SCNC: 101 MMOL/L (ref 96–108)
CO2 SERPL-SCNC: 26 MMOL/L (ref 21–32)
CREAT SERPL-MCNC: 0.91 MG/DL (ref 0.6–1.3)
EOSINOPHIL # BLD AUTO: 0.12 THOUSAND/ÂΜL (ref 0–0.61)
EOSINOPHIL NFR BLD AUTO: 1 % (ref 0–6)
ERYTHROCYTE [DISTWIDTH] IN BLOOD BY AUTOMATED COUNT: 11.7 % (ref 11.6–15.1)
FLUAV RNA RESP QL NAA+PROBE: NEGATIVE
FLUBV RNA RESP QL NAA+PROBE: NEGATIVE
GFR SERPL CREATININE-BSD FRML MDRD: 97 ML/MIN/1.73SQ M
GLUCOSE SERPL-MCNC: 102 MG/DL (ref 65–140)
HCT VFR BLD AUTO: 40.7 % (ref 36.5–49.3)
HGB BLD-MCNC: 13.9 G/DL (ref 12–17)
IMM GRANULOCYTES # BLD AUTO: 0.07 THOUSAND/UL (ref 0–0.2)
IMM GRANULOCYTES NFR BLD AUTO: 1 % (ref 0–2)
LYMPHOCYTES # BLD AUTO: 2.41 THOUSANDS/ÂΜL (ref 0.6–4.47)
LYMPHOCYTES NFR BLD AUTO: 18 % (ref 14–44)
MCH RBC QN AUTO: 30.9 PG (ref 26.8–34.3)
MCHC RBC AUTO-ENTMCNC: 34.2 G/DL (ref 31.4–37.4)
MCV RBC AUTO: 90 FL (ref 82–98)
MONOCYTES # BLD AUTO: 0.79 THOUSAND/ÂΜL (ref 0.17–1.22)
MONOCYTES NFR BLD AUTO: 6 % (ref 4–12)
NEUTROPHILS # BLD AUTO: 10.33 THOUSANDS/ÂΜL (ref 1.85–7.62)
NEUTS SEG NFR BLD AUTO: 74 % (ref 43–75)
NRBC BLD AUTO-RTO: 0 /100 WBCS
P AXIS: 47 DEGREES
PLATELET # BLD AUTO: 333 THOUSANDS/UL (ref 149–390)
PMV BLD AUTO: 9.4 FL (ref 8.9–12.7)
POTASSIUM SERPL-SCNC: 3.3 MMOL/L (ref 3.5–5.3)
PR INTERVAL: 146 MS
PROT SERPL-MCNC: 7.9 G/DL (ref 6.4–8.4)
QRS AXIS: 24 DEGREES
QRSD INTERVAL: 78 MS
QT INTERVAL: 402 MS
QTC INTERVAL: 404 MS
RBC # BLD AUTO: 4.5 MILLION/UL (ref 3.88–5.62)
RSV RNA RESP QL NAA+PROBE: NEGATIVE
S PYO DNA THROAT QL NAA+PROBE: NOT DETECTED
SARS-COV-2 RNA RESP QL NAA+PROBE: NEGATIVE
SODIUM SERPL-SCNC: 136 MMOL/L (ref 135–147)
T WAVE AXIS: 39 DEGREES
VENTRICULAR RATE: 61 BPM
WBC # BLD AUTO: 13.76 THOUSAND/UL (ref 4.31–10.16)

## 2024-07-06 PROCEDURE — 99285 EMERGENCY DEPT VISIT HI MDM: CPT

## 2024-07-06 PROCEDURE — 96365 THER/PROPH/DIAG IV INF INIT: CPT

## 2024-07-06 PROCEDURE — 0241U HB NFCT DS VIR RESP RNA 4 TRGT: CPT | Performed by: EMERGENCY MEDICINE

## 2024-07-06 PROCEDURE — 87651 STREP A DNA AMP PROBE: CPT

## 2024-07-06 PROCEDURE — 99284 EMERGENCY DEPT VISIT MOD MDM: CPT

## 2024-07-06 PROCEDURE — 93010 ELECTROCARDIOGRAM REPORT: CPT | Performed by: INTERNAL MEDICINE

## 2024-07-06 PROCEDURE — 71046 X-RAY EXAM CHEST 2 VIEWS: CPT

## 2024-07-06 PROCEDURE — 93005 ELECTROCARDIOGRAM TRACING: CPT

## 2024-07-06 PROCEDURE — 85025 COMPLETE CBC W/AUTO DIFF WBC: CPT | Performed by: EMERGENCY MEDICINE

## 2024-07-06 PROCEDURE — 96366 THER/PROPH/DIAG IV INF ADDON: CPT

## 2024-07-06 PROCEDURE — 94640 AIRWAY INHALATION TREATMENT: CPT

## 2024-07-06 PROCEDURE — 84484 ASSAY OF TROPONIN QUANT: CPT | Performed by: EMERGENCY MEDICINE

## 2024-07-06 PROCEDURE — 36415 COLL VENOUS BLD VENIPUNCTURE: CPT

## 2024-07-06 PROCEDURE — 80053 COMPREHEN METABOLIC PANEL: CPT | Performed by: EMERGENCY MEDICINE

## 2024-07-06 PROCEDURE — 96375 TX/PRO/DX INJ NEW DRUG ADDON: CPT

## 2024-07-06 RX ORDER — ONDANSETRON 2 MG/ML
4 INJECTION INTRAMUSCULAR; INTRAVENOUS ONCE
Status: COMPLETED | OUTPATIENT
Start: 2024-07-06 | End: 2024-07-06

## 2024-07-06 RX ORDER — METHYLPREDNISOLONE 4 MG/1
TABLET ORAL
Qty: 21 TABLET | Refills: 0 | Status: SHIPPED | OUTPATIENT
Start: 2024-07-07 | End: 2024-07-18

## 2024-07-06 RX ORDER — ONDANSETRON 4 MG/1
4 TABLET, ORALLY DISINTEGRATING ORAL EVERY 6 HOURS PRN
Qty: 20 TABLET | Refills: 0 | Status: SHIPPED | OUTPATIENT
Start: 2024-07-06 | End: 2024-07-18

## 2024-07-06 RX ORDER — DEXAMETHASONE SODIUM PHOSPHATE 10 MG/ML
10 INJECTION, SOLUTION INTRAMUSCULAR; INTRAVENOUS ONCE
Status: COMPLETED | OUTPATIENT
Start: 2024-07-06 | End: 2024-07-06

## 2024-07-06 RX ORDER — ALBUTEROL SULFATE 90 UG/1
2 AEROSOL, METERED RESPIRATORY (INHALATION) EVERY 4 HOURS PRN
Qty: 18 G | Refills: 0 | Status: SHIPPED | OUTPATIENT
Start: 2024-07-06 | End: 2024-07-20

## 2024-07-06 RX ORDER — ALBUTEROL SULFATE 2.5 MG/3ML
5 SOLUTION RESPIRATORY (INHALATION) ONCE
Status: COMPLETED | OUTPATIENT
Start: 2024-07-06 | End: 2024-07-06

## 2024-07-06 RX ORDER — ALBUTEROL SULFATE 90 UG/1
2 AEROSOL, METERED RESPIRATORY (INHALATION) ONCE
Status: COMPLETED | OUTPATIENT
Start: 2024-07-06 | End: 2024-07-06

## 2024-07-06 RX ORDER — SODIUM CHLORIDE, SODIUM GLUCONATE, SODIUM ACETATE, POTASSIUM CHLORIDE, MAGNESIUM CHLORIDE, SODIUM PHOSPHATE, DIBASIC, AND POTASSIUM PHOSPHATE .53; .5; .37; .037; .03; .012; .00082 G/100ML; G/100ML; G/100ML; G/100ML; G/100ML; G/100ML; G/100ML
1000 INJECTION, SOLUTION INTRAVENOUS ONCE
Status: COMPLETED | OUTPATIENT
Start: 2024-07-06 | End: 2024-07-06

## 2024-07-06 RX ORDER — ALBUTEROL SULFATE 90 UG/1
2 AEROSOL, METERED RESPIRATORY (INHALATION) EVERY 4 HOURS PRN
Qty: 18 G | Refills: 0 | Status: SHIPPED | OUTPATIENT
Start: 2024-07-06 | End: 2024-07-06

## 2024-07-06 RX ADMIN — IPRATROPIUM BROMIDE 0.5 MG: 0.5 SOLUTION RESPIRATORY (INHALATION) at 21:20

## 2024-07-06 RX ADMIN — ALBUTEROL SULFATE 5 MG: 2.5 SOLUTION RESPIRATORY (INHALATION) at 21:20

## 2024-07-06 RX ADMIN — ONDANSETRON 4 MG: 2 INJECTION INTRAMUSCULAR; INTRAVENOUS at 21:15

## 2024-07-06 RX ADMIN — DEXAMETHASONE SODIUM PHOSPHATE 10 MG: 10 INJECTION, SOLUTION INTRAMUSCULAR; INTRAVENOUS at 21:16

## 2024-07-06 RX ADMIN — ALBUTEROL SULFATE 2 PUFF: 90 AEROSOL, METERED RESPIRATORY (INHALATION) at 23:13

## 2024-07-06 RX ADMIN — SODIUM CHLORIDE, SODIUM GLUCONATE, SODIUM ACETATE, POTASSIUM CHLORIDE, MAGNESIUM CHLORIDE, SODIUM PHOSPHATE, DIBASIC, AND POTASSIUM PHOSPHATE 1000 ML: .53; .5; .37; .037; .03; .012; .00082 INJECTION, SOLUTION INTRAVENOUS at 21:20

## 2024-07-07 NOTE — DISCHARGE INSTRUCTIONS
Use the prescribed medications as directed to support you during this time when you are sick.  Increase your fluids by taking small sips of water throughout the day.  Follow-up with primary care for reevaluation in 2 to 3 days.  Return to the ER if develop persistent fevers, new or worsening nausea/vomiting, difficulty breathing, weakness, confusion, or lethargy.

## 2024-07-07 NOTE — ED PROVIDER NOTES
History  Chief Complaint   Patient presents with    Shortness of Breath     Patient presents for shortness of breath x 5 days.  Patient's wife is currently admitted for bacterial pneumonia and the patient is concerned he has the same. .      The patient is a 49-year-old male presenting for evaluation of cough and shortness of breath.  The patient started 5 days ago with nasal congestion, sore throat, productive cough, and shortness of breath.  He endorses associated mild headaches, fatigue, and some nausea..  He reports his wife was sick with similar symptoms and recently hospitalized after finding of bacterial pneumonia.  He reports associated fevers with Tmax 105 Fahrenheit.  He reports congested cough with chunky yellow sputum.  He denies chest pain at rest but notes chest discomfort when coughing.  He notes this shortness of breath is all the time but worse after coughing fit.  He denies smoking cigarettes but reports vaping.  He denies abdominal pain, change in bowel, urinary tract symptoms, leg swelling, and leg pain.      History provided by:  Patient   used: No    Shortness of Breath  Associated symptoms: chest pain (With coughing fits), cough (Congested, productive, yellow thick sputum), ear pain (Right-sided ear discomfort), fever, headaches, sore throat and vomiting    Associated symptoms: no abdominal pain        Prior to Admission Medications   Prescriptions Last Dose Informant Patient Reported? Taking?   amitriptyline (ELAVIL) 10 mg tablet   Yes No   Sig: TAKE TWO TABLETS BY MOUTH AT BEDTIME FOR YOUR MOOD   diclofenac sodium (VOLTAREN) 50 mg EC tablet   Yes No   Sig: TAKE ONE TABLET BY MOUTH TWICE DAILY AS NEEDED , FOR PAIN AND INFLAMMATION.   metoclopramide (REGLAN) 10 mg tablet   Yes No   Sig: TAKE ONE TABLET BY MOUTH TWICE DAILY AS NEEDED , FOR ACID REFLUX.   naproxen (NAPROSYN) 500 mg tablet   No No   Sig: Take 1 tablet (500 mg total) by mouth 2 (two) times a day with meals    ondansetron (ZOFRAN) 4 mg tablet   No No   Sig: Take 1 tablet (4 mg total) by mouth every 6 (six) hours   ondansetron (Zofran ODT) 4 mg disintegrating tablet   No No   Sig: Take 1 tablet (4 mg total) by mouth every 6 (six) hours as needed for nausea or vomiting   traMADol (ULTRAM) 50 mg tablet   Yes No   Sig: Take 50 mg by mouth every 4 (four) hours as needed      Facility-Administered Medications: None       Past Medical History:   Diagnosis Date    Back pain     Hypertension     Migraines     TBI (traumatic brain injury)        Past Surgical History:   Procedure Laterality Date    REVISION OF SCAR ON FACE/HEAD         No family history on file.  I have reviewed and agree with the history as documented.    E-Cigarette/Vaping    E-Cigarette Use Current Every Day User      E-Cigarette/Vaping Substances    Nicotine No     THC No     CBD No     Flavoring No     Other No     Unknown No      Social History     Tobacco Use    Smoking status: Never    Smokeless tobacco: Never   Vaping Use    Vaping status: Every Day   Substance Use Topics    Alcohol use: Yes     Comment: social     Drug use: Never       Review of Systems   Constitutional:  Positive for appetite change (Decreased x 5 days), fatigue and fever.   HENT:  Positive for congestion, ear pain (Right-sided ear discomfort) and sore throat. Negative for dental problem, drooling, ear discharge, facial swelling and trouble swallowing.    Eyes:  Negative for pain and visual disturbance.   Respiratory:  Positive for cough (Congested, productive, yellow thick sputum) and shortness of breath.    Cardiovascular:  Positive for chest pain (With coughing fits). Negative for palpitations and leg swelling.   Gastrointestinal:  Positive for nausea and vomiting. Negative for abdominal pain, constipation and diarrhea.   Genitourinary: Negative.    Musculoskeletal:  Negative for back pain and gait problem.   Neurological:  Positive for headaches. Negative for dizziness, syncope,  weakness and light-headedness.   All other systems reviewed and are negative.      Physical Exam  Physical Exam  Vitals and nursing note reviewed.   Constitutional:       General: He is not in acute distress.     Appearance: Normal appearance. He is well-developed. He is not ill-appearing (Appears fatigued, nontoxic, non-ill-appearing), toxic-appearing or diaphoretic.   HENT:      Head: Normocephalic and atraumatic.      Jaw: There is normal jaw occlusion. No trismus.      Right Ear: Ear canal and external ear normal. No drainage. A middle ear effusion is present. No mastoid tenderness.      Left Ear: Tympanic membrane, ear canal and external ear normal. No drainage. No mastoid tenderness. Tympanic membrane is not bulging.      Nose: Congestion present. No rhinorrhea.      Mouth/Throat:      Lips: Pink.      Mouth: Mucous membranes are moist.      Tongue: No lesions.      Palate: No lesions.      Pharynx: Oropharynx is clear. Uvula midline. Posterior oropharyngeal erythema present. No uvula swelling.      Tonsils: No tonsillar exudate or tonsillar abscesses. 1+ on the right. 1+ on the left.      Comments: 2 small vesicles to right tonsil  Eyes:      General: Lids are normal. Vision grossly intact. Gaze aligned appropriately.      Extraocular Movements: Extraocular movements intact.      Conjunctiva/sclera: Conjunctivae normal.      Pupils: Pupils are equal, round, and reactive to light.   Cardiovascular:      Rate and Rhythm: Normal rate and regular rhythm.      Pulses:           Radial pulses are 2+ on the right side and 2+ on the left side.      Heart sounds: Normal heart sounds, S1 normal and S2 normal.   Pulmonary:      Effort: Pulmonary effort is normal. No tachypnea, accessory muscle usage, respiratory distress or retractions.      Breath sounds: Normal air entry. Examination of the right-lower field reveals decreased breath sounds and wheezing. Examination of the left-lower field reveals decreased breath  sounds and wheezing. Decreased breath sounds and wheezing present.   Abdominal:      Palpations: Abdomen is soft.      Tenderness: There is no abdominal tenderness.   Musculoskeletal:         General: Normal range of motion.      Cervical back: Neck supple.      Right lower leg: No edema.      Left lower leg: No edema.   Skin:     General: Skin is warm and dry.      Capillary Refill: Capillary refill takes 2 to 3 seconds.      Findings: No rash or wound.   Neurological:      General: No focal deficit present.      Mental Status: He is alert and oriented to person, place, and time. Mental status is at baseline.         Vital Signs  ED Triage Vitals   Temperature Pulse Respirations Blood Pressure SpO2   07/06/24 2013 07/06/24 2013 07/06/24 2013 07/06/24 2013 07/06/24 2013   98.2 °F (36.8 °C) 73 22 121/81 97 %      Temp Source Heart Rate Source Patient Position - Orthostatic VS BP Location FiO2 (%)   07/06/24 2013 07/06/24 2200 07/06/24 2013 07/06/24 2013 --   Temporal Monitor Sitting Right arm       Pain Score       07/06/24 2310       No Pain           Vitals:    07/06/24 2013 07/06/24 2200 07/06/24 2300 07/06/24 2310   BP: 121/81   128/79   Pulse: 73 58 59 62   Patient Position - Orthostatic VS: Sitting   Sitting         Visual Acuity      ED Medications  Medications   albuterol inhalation solution 5 mg (5 mg Nebulization Given 7/6/24 2120)   ipratropium (ATROVENT) 0.02 % inhalation solution 0.5 mg (0.5 mg Nebulization Given 7/6/24 2120)   dexamethasone (PF) (DECADRON) injection 10 mg (10 mg Intravenous Given 7/6/24 2116)   ondansetron (ZOFRAN) injection 4 mg (4 mg Intravenous Given 7/6/24 2115)   multi-electrolyte (PLASMALYTE-A/ISOLYTE-S PH 7.4) IV solution 1,000 mL (0 mL Intravenous Stopped 7/6/24 2313)   albuterol (PROVENTIL HFA,VENTOLIN HFA) inhaler 2 puff (2 puffs Inhalation Given 7/6/24 2313)       Diagnostic Studies  Results Reviewed       Procedure Component Value Units Date/Time    Strep A PCR [[id number]]   (Normal) Collected: 07/06/24 2122    Lab Status: Final result Specimen: Throat Updated: 07/06/24 2151     STREP A PCR Not Detected    FLU/RSV/COVID - if FLU/RSV clinically relevant [[id number]]  (Normal) Collected: 07/06/24 2018    Lab Status: Final result Specimen: Nares from Nose Updated: 07/06/24 2101     SARS-CoV-2 Negative     INFLUENZA A PCR Negative     INFLUENZA B PCR Negative     RSV PCR Negative    Narrative:      FOR PEDIATRIC PATIENTS - copy/paste COVID Guidelines URL to browser: [email or web address]    SARS-CoV-2 assay is a Nucleic Acid Amplification assay intended for the  qualitative detection of nucleic acid from SARS-CoV-2 in nasopharyngeal  swabs. Results are for the presumptive identification of SARS-CoV-2 RNA.    Positive results are indicative of infection with SARS-CoV-2, the virus  causing COVID-19, but do not rule out bacterial infection or co-infection  with other viruses. Laboratories within the [place] and its  territories are required to report all positive results to the appropriate  public health authorities. Negative results do not preclude SARS-CoV-2  infection and should not be used as the sole basis for treatment or other  patient management decisions. Negative results must be combined with  clinical observations, patient history, and epidemiological information.  This test has not been FDA cleared or approved.    This test has been authorized by FDA under an Emergency Use Authorization  (EUA). This test is only authorized for the duration of time the  declaration that circumstances exist justifying the authorization of the  emergency use of an in vitro diagnostic tests for detection of SARS-CoV-2  virus and/or diagnosis of COVID-19 infection under section 564(b)(1) of  the Act, 21 U.S.C. 360bbb-3(b)(1), unless the authorization is terminated  or revoked sooner. The test has been validated but independent review by FDA  and  CLIA is pending.    Test performed using [employer] GeneXpert: This RT-PCR assay targets N2,  a region unique to SARS-CoV-2. A conserved region in the E-gene was chosen  for pan-Sarbecovirus detection which includes SARS-CoV-2.    According to CMS-2020-01-R, this platform meets the definition of high-throughput technology.    HS Troponin 0hr (reflex protocol) [[id number]]  (Normal) Collected: 07/06/24 2018    Lab Status: Final result Specimen: Blood from Hand, Right Updated: 07/06/24 2045     hs TnI 0hr 4 ng/L     Comprehensive metabolic panel [[id number]]  (Abnormal) Collected: 07/06/24 2018    Lab Status: Final result Specimen: Blood from Hand, Right Updated: 07/06/24 2043     Sodium 136 mmol/L      Potassium 3.3 mmol/L      Chloride 101 mmol/L      CO2 26 mmol/L      ANION GAP 9 mmol/L      BUN 16 mg/dL      Creatinine 0.91 mg/dL      Glucose 102 mg/dL      Calcium 9.6 mg/dL      AST 15 U/L      ALT 18 U/L      Alkaline Phosphatase 48 U/L      Total Protein 7.9 g/dL      Albumin 4.1 g/dL      Total Bilirubin 0.76 mg/dL      eGFR 97 ml/min/1.73sq m     Narrative:      National Kidney Disease Foundation guidelines for Chronic Kidney Disease (CKD):     Stage 1 with normal or high GFR (GFR > 90 mL/min/1.73 square meters)    Stage 2 Mild CKD (GFR = 60-89 mL/min/1.73 square meters)    Stage 3A Moderate CKD (GFR = 45-59 mL/min/1.73 square meters)    Stage 3B Moderate CKD (GFR = 30-44 mL/min/1.73 square meters)    Stage 4 Severe CKD (GFR = 15-29 mL/min/1.73 square meters)    Stage 5 End Stage CKD (GFR <15 mL/min/1.73 square meters)  Note: GFR calculation is accurate only with a steady state creatinine    CBC and differential [[id number]]  (Abnormal) Collected: 07/06/24 2018    Lab Status: Final result Specimen: Blood from Hand, Right Updated: 07/06/24 2023     WBC 13.76 Thousand/uL      RBC 4.50 Million/uL      Hemoglobin 13.9 g/dL      Hematocrit 40.7 %      MCV 90 fL      MCH 30.9 pg      MCHC 34.2 g/dL      RDW 11.7 %       MPV 9.4 fL      Platelets 333 Thousands/uL      nRBC 0 /100 WBCs      Segmented % 74 %      Immature Grans % 1 %      Lymphocytes % 18 %      Monocytes % 6 %      Eosinophils Relative 1 %      Basophils Relative 0 %      Absolute Neutrophils 10.33 Thousands/µL      Absolute Immature Grans 0.07 Thousand/uL      Absolute Lymphocytes 2.41 Thousands/µL      Absolute Monocytes 0.79 Thousand/µL      Eosinophils Absolute 0.12 Thousand/µL      Basophils Absolute 0.04 Thousands/µL                    XR chest 2 views   Final Result by Sari Lutz MD (07/06 2046)      No acute cardiopulmonary disease.               Workstation performed: [id number]                    Procedures  ECG 12 Lead Documentation Only    Date/Time: 7/6/2024 9:28 PM    Performed by: JASON Zamora  Authorized by: JASON Zamora    Indications / Diagnosis:  SOB  ECG reviewed by me, the ED Provider: yes    Patient location:  ED  Previous ECG:     Previous ECG:  Compared to current    Comparison ECG info:  January 27, 2023    Similarity:  No change    Comparison to cardiac monitor: Yes    Interpretation:     Interpretation: normal    Rate:     ECG rate:  61    ECG rate assessment: normal    Rhythm:     Rhythm: sinus rhythm    Ectopy:     Ectopy: none    QRS:     QRS axis:  Normal    QRS intervals:  Normal  Conduction:     Conduction: normal    ST segments:     ST segments:  Normal  T waves:     T waves: normal    Comments:      Sinus rhythm, normal axis, normal intervals, no acute ischemic changes read by me           ED Course  ED Course as of 07/06/24 2324   Sat Jul 06, 2024 2031 First nurse orders placed including CMP, CBC, viral swab, troponin, two-view chest x-ray   2053 XR chest 2 views  IMPRESSION:     No acute cardiopulmonary disease.     2153 STREP A PCR: Not Detected  Noted negative   2215 On reevaluation, patient resting comfortably.  Repeat breath sounds are with improved aeration throughout, no expiratory wheezes.  Pulse ox  remained stable at 97%.  Starting p.o. challenge as nausea is resolved with Zofran   2308 Patient passed p.o. challenge without difficulty.  Appears much more comfortable.             HEART Risk Score      Flowsheet Row Most Recent Value   Heart Score Risk Calculator    History 0 Filed at: 07/06/2024 2305   ECG 0 Filed at: 07/06/2024 2305   Age 1 Filed at: 07/06/2024 2305   Risk Factors 2 Filed at: 07/06/2024 2305   Troponin 0 Filed at: 07/06/2024 2305   HEART Score 3 Filed at: 07/06/2024 2305                          SBIRT 22yo+      Flowsheet Row Most Recent Value   Initial Alcohol Screen: US AUDIT-C     1. How often do you have a drink containing alcohol? 0 Filed at: 07/06/2024 2016   3a. Male UNDER 65: How often do you have five or more drinks on one occasion? 0 Filed at: 07/06/2024 2016   Audit-C Score 0 Filed at: 07/06/2024 2016   AWAIS: How many times in the past year have you...    Used an illegal drug or used a prescription medication for non-medical reasons? Never Filed at: 07/06/2024 2016                      Medical Decision Making  DDx including but not limited to: Viral illness, URI, bronchitis, pneumonia; considered but less likely atypical ACS    Patient presenting for 5 days of URI symptoms.  Physical exam with diminished breath sounds in bilateral bases with expiratory wheezes. He does vape daily. Given 5.5 albuterol-ipratropium with notable improvement in breath sounds as well as symptomatic relief of the patient's shortness of breath.  Labs with mild hypokalemia, no other electrolyte derangement, SHASHI, organ dysfunction, or anemia.  Leukocytosis noted.  Chest x-ray formal read without acute pneumonia or abnormality.  EKG nonischemic and troponin negative.  Suspect chest wall discomfort secondary to frequent coughing.  Patient given fluids and Zofran with complete resolution of nausea.  He was able to pass p.o. challenge without difficulty.  Will start supportive care for acute bronchitis.  Will  have him closely follow-up with his primary care in 2 to 3 days for reevaluation.  Discussed strict return precautions and he demonstrates understanding by teach back method.  He is with improved appearance, in no acute distress, and ambulatory with steady gait at time of discharge.    Problems Addressed:  Acute bronchitis: acute illness or injury  Nausea: self-limited or minor problem    Amount and/or Complexity of Data Reviewed  Labs: ordered. Decision-making details documented in ED Course.  Radiology: ordered. Decision-making details documented in ED Course.  ECG/medicine tests: ordered and independent interpretation performed.    Risk  OTC drugs.  Prescription drug management.             Disposition  Final diagnoses:   Acute bronchitis   Nausea     Time reflects when diagnosis was documented in both MDM as applicable and the Disposition within this note       Time User Action Codes Description Comment    7/6/2024 11:05 PM Shjuanis Mami Add [J21.9] Acute bronchiolitis     7/6/2024 11:05 PM Shugars Mami Remove [J21.9] Acute bronchiolitis     7/6/2024 11:05 PM Shugars Mami Add [J20.9] Acute bronchitis     7/6/2024 11:07 PM Shugahaile Mami Add [R11.0] Nausea           ED Disposition       ED Disposition   Discharge    Condition   Stable    Date/Time   Sat Jul 6, 2024 2306    Comment   Lasha Olvera discharge to home/self care.                   Follow-up Information       Follow up With Specialties Details Why Contact Info Additional Information    Your Primary Care Provider  Schedule an appointment as soon as possible for a visit in 2 days        [facility] Emergency Department Emergency Medicine Go to  If symptoms worsen [address] [facility] [address] [phone] [facility] Emergency Department, [address] [place], [place] [address]            Discharge Medication List as of 7/6/2024 11:08 PM        START taking these  medications    Details   dextromethorphan-guaifenesin (MUCINEX DM)  MG per 12 hr tablet Take 1 tablet by mouth every 12 (twelve) hours for 7 days Do not start before July 7, 2024., Starting Sun 7/7/2024, Until Sun 7/14/2024, Normal      methylPREDNISolone 4 MG tablet therapy pack Use as directed on package Do not start before July 7, 2024., Normal           CONTINUE these medications which have CHANGED    Details   albuterol (PROVENTIL HFA,VENTOLIN HFA) 90 mcg/act inhaler Inhale 2 puffs every 4 (four) hours as needed for wheezing or shortness of breath for up to 14 days, Starting Sat 7/6/2024, Until Sat 7/20/2024 at 2359, Normal      ondansetron (ZOFRAN-ODT) 4 mg disintegrating tablet Take 1 tablet (4 mg total) by mouth every 6 (six) hours as needed for nausea or vomiting, Starting Sat 7/6/2024, Normal           CONTINUE these medications which have NOT CHANGED    Details   amitriptyline (ELAVIL) 10 mg tablet TAKE TWO TABLETS BY MOUTH AT BEDTIME FOR YOUR MOOD, Historical Med      diclofenac sodium (VOLTAREN) 50 mg EC tablet TAKE ONE TABLET BY MOUTH TWICE DAILY AS NEEDED , FOR PAIN AND INFLAMMATION., Historical Med      metoclopramide (REGLAN) 10 mg tablet TAKE ONE TABLET BY MOUTH TWICE DAILY AS NEEDED , FOR ACID REFLUX., Historical Med      naproxen (NAPROSYN) 500 mg tablet Take 1 tablet (500 mg total) by mouth 2 (two) times a day with meals, Starting Fri 11/20/2020, Normal      ondansetron (ZOFRAN) 4 mg tablet Take 1 tablet (4 mg total) by mouth every 6 (six) hours, Starting Fri 1/27/2023, Normal      traMADol (ULTRAM) 50 mg tablet Take 50 mg by mouth every 4 (four) hours as needed, Starting Wed 11/11/2020, Historical Med             No discharge procedures on file.    PDMP Review       None            ED Provider  Electronically Signed by             JASON Zamora  07/06/24 3366       JASON Zamora  07/06/24 0399

## 2024-07-17 PROBLEM — Z72.0 TOBACCO USER: Status: ACTIVE | Noted: 2024-07-17

## 2024-07-17 PROBLEM — I10 ESSENTIAL (PRIMARY) HYPERTENSION: Status: ACTIVE | Noted: 2024-07-17

## 2024-07-17 PROBLEM — G47.00 INSOMNIA: Status: ACTIVE | Noted: 2024-07-17

## 2024-07-17 PROBLEM — E78.89 OTHER LIPOPROTEIN METABOLISM DISORDERS: Status: ACTIVE | Noted: 2024-07-17

## 2024-07-17 PROBLEM — E66.9 OBESITY: Status: ACTIVE | Noted: 2024-07-17

## 2024-07-17 PROBLEM — G47.30 SLEEP APNEA: Status: ACTIVE | Noted: 2024-07-17

## 2024-07-17 PROBLEM — E11.620 NECROBIOSIS LIPOIDICA DIABETICORUM (HCC): Status: ACTIVE | Noted: 2024-07-17

## 2024-07-17 PROBLEM — N52.9 ERECTILE DYSFUNCTION: Status: ACTIVE | Noted: 2024-07-17

## 2024-07-17 PROBLEM — Z77.29 EXPOSURE TO POTENTIALLY HAZARDOUS SUBSTANCE: Status: ACTIVE | Noted: 2024-07-17

## 2024-07-17 PROBLEM — K21.9 GASTROESOPHAGEAL REFLUX DISEASE: Status: ACTIVE | Noted: 2024-07-17

## 2024-07-17 PROBLEM — G43.009 MIGRAINE WITHOUT AURA, NOT INTRACTABLE, WITHOUT STATUS MIGRAINOSUS: Status: ACTIVE | Noted: 2024-07-17

## 2024-07-17 NOTE — PROGRESS NOTES
Adult Annual Physical  Name: Lasha Olvera      : 1975      MRN: [id number]  Encounter Provider: Maru Leone DO  Encounter Date: 2024   Encounter department: [facility] PRIMARY CARE    Assessment & Plan   1. Annual physical exam  Immunizations and preventive care screenings were discussed with patient today. Appropriate education was printed on patient's after visit summary.    {Prostate cancer screening - consider in males between age of 40-75 depending on age, race, and risk factors. There is difference in the guidelines in regards to the optimal age for screening.  USPSTF states to consider periodic screening in males between the age of 50 to 69. This text is informational and does not need to be selected but if you wish, you can insert standard documentation in your progress note by using F2 (Optional):13756}    Counseling:  {Annual Physical; Counselin}         History of Present Illness   {Disappearing Hyperlinks I Encounters * My Last Note * Since Last Visit * History :62691}  Adult Annual Physical  Patient is a 49 year old male with chronic medical problems as noted below who is being seen today as a new patient for a PE.   Previous PCP=***  Colon cancer screening ***  PSA ***  Immunizations (adacel, shingrix, pneumococcal vaccine) ***    Patient Active Problem List   Diagnosis   • Erectile dysfunction   • Exposure to potentially hazardous substance   • Gastroesophageal reflux disease   • Essential (primary) hypertension   • Insomnia   • Migraine without aura, not intractable, without status migrainosus   • Necrobiosis lipoidica diabeticorum (HCC)   • Obesity   • Other lipoprotein metabolism disorders   • Sleep apnea   • Tobacco user       Review of Systems  {Select to Display PMH (Optional):79191}    Objective   {Disappearing Hyperlinks   Review Vitals * Enter New Vitals * Results Review * Labs * Imaging * Cardiology * Procedures * Lung Cancer Screening :28093}  BP  "114/80 (BP Location: Left arm, Patient Position: Sitting, Cuff Size: Adult)   Pulse 68   Temp 97.9 °F (36.6 °C) (Tympanic)   Resp 18   Ht 5' 10.5\" (1.791 m)   Wt 122 kg (270 lb)   SpO2 98%   BMI 38.19 kg/m²     Physical Exam  {Administrative / Billing Section (Optional):83061}  "

## 2024-07-18 ENCOUNTER — OFFICE VISIT (OUTPATIENT)
Dept: FAMILY MEDICINE CLINIC | Facility: CLINIC | Age: 49
End: 2024-07-18
Payer: COMMERCIAL

## 2024-07-18 VITALS
HEART RATE: 68 BPM | BODY MASS INDEX: 37.8 KG/M2 | SYSTOLIC BLOOD PRESSURE: 114 MMHG | DIASTOLIC BLOOD PRESSURE: 80 MMHG | HEIGHT: 71 IN | WEIGHT: 270 LBS | TEMPERATURE: 97.9 F | RESPIRATION RATE: 18 BRPM | OXYGEN SATURATION: 98 %

## 2024-07-18 DIAGNOSIS — Z76.89 ENCOUNTER TO ESTABLISH CARE: Primary | ICD-10-CM

## 2024-07-18 DIAGNOSIS — G47.00 INSOMNIA, UNSPECIFIED TYPE: ICD-10-CM

## 2024-07-18 DIAGNOSIS — G47.30 SLEEP APNEA, UNSPECIFIED TYPE: ICD-10-CM

## 2024-07-18 DIAGNOSIS — J01.00 ACUTE NON-RECURRENT MAXILLARY SINUSITIS: ICD-10-CM

## 2024-07-18 DIAGNOSIS — H60.501 ACUTE OTITIS EXTERNA OF RIGHT EAR, UNSPECIFIED TYPE: ICD-10-CM

## 2024-07-18 DIAGNOSIS — G43.009 MIGRAINE WITHOUT AURA, NOT INTRACTABLE, WITHOUT STATUS MIGRAINOSUS: ICD-10-CM

## 2024-07-18 DIAGNOSIS — I10 ESSENTIAL (PRIMARY) HYPERTENSION: ICD-10-CM

## 2024-07-18 DIAGNOSIS — E11.620 NECROBIOSIS LIPOIDICA DIABETICORUM (HCC): ICD-10-CM

## 2024-07-18 PROBLEM — K21.9 GASTROESOPHAGEAL REFLUX DISEASE: Status: RESOLVED | Noted: 2024-07-17 | Resolved: 2024-07-18

## 2024-07-18 PROBLEM — Z72.0 TOBACCO USER: Status: RESOLVED | Noted: 2024-07-17 | Resolved: 2024-07-18

## 2024-07-18 PROCEDURE — 99203 OFFICE O/P NEW LOW 30 MIN: CPT | Performed by: FAMILY MEDICINE

## 2024-07-18 RX ORDER — ELETRIPTAN HYDROBROMIDE 40 MG/1
40 TABLET, FILM COATED ORAL ONCE AS NEEDED
COMMUNITY
Start: 2024-03-20 | End: 2025-04-06

## 2024-07-18 RX ORDER — AMOXICILLIN AND CLAVULANATE POTASSIUM 875; 125 MG/1; MG/1
1 TABLET, FILM COATED ORAL EVERY 12 HOURS SCHEDULED
Qty: 14 TABLET | Refills: 0 | Status: SHIPPED | OUTPATIENT
Start: 2024-07-18 | End: 2024-07-25

## 2024-07-18 RX ORDER — PROPRANOLOL HCL 60 MG
60 CAPSULE, EXTENDED RELEASE 24HR ORAL DAILY
COMMUNITY
Start: 2024-04-05 | End: 2025-04-06

## 2024-07-18 RX ORDER — TOPIRAMATE 25 MG/1
25 TABLET ORAL DAILY
COMMUNITY
Start: 2024-03-20 | End: 2025-03-21

## 2024-07-18 RX ORDER — TRAZODONE HYDROCHLORIDE 50 MG/1
50 TABLET ORAL
COMMUNITY
Start: 2024-04-22 | End: 2025-04-23

## 2024-07-18 RX ORDER — MAGNESIUM OXIDE 420 MG/1
TABLET ORAL
COMMUNITY
Start: 2024-04-22

## 2024-07-18 RX ORDER — CHLORTHALIDONE 25 MG/1
25 TABLET ORAL DAILY
COMMUNITY
Start: 2024-04-22

## 2024-07-18 NOTE — ASSESSMENT & PLAN NOTE
[facility]  Currently on topamax for prophylaxis.   On relpax for abortive therapy

## 2024-07-18 NOTE — PROGRESS NOTES
Ambulatory Visit  Name: Lasha Olvera      : 1975      MRN: [id number]  Encounter Provider: Maru Leone DO  Encounter Date: 2024   Encounter department: [facility]IMARY [facility]    Assessment & Plan   1. Encounter to establish care  [facility]   Will obtain records  2. Acute non-recurrent maxillary sinusitis  -     amoxicillin-clavulanate (AUGMENTIN) 875-125 mg per tablet; Take 1 tablet by mouth every 12 (twelve) hours for 7 days  Treat with augmentin x 7 days  Fluids, mucinex and flonase will help as well.   3. Acute otitis externa of right ear, unspecified type  -     amoxicillin-clavulanate (AUGMENTIN) 875-125 mg per tablet; Take 1 tablet by mouth every 12 (twelve) hours for 7 days  4. Migraine without aura, not intractable, without status migrainosus  Assessment & Plan:  [facility]  Currently on topamax for prophylaxis.   On relpax for abortive therapy  5. Essential (primary) hypertension  Assessment & Plan:  On chlorthalidone 25 mg daily  Bp at goal  6. Sleep apnea, unspecified type  7. Insomnia, unspecified type  Assessment & Plan:  On trazodone for this  8. Necrobiosis lipoidica diabeticorum (HCC)  Assessment & Plan:  Patient not aware of this diagnosis  [facility]         History of Present Illness     HPI  Patient is a 49 year old male with chronic medical problems as noted below who is being seen today as a new patient to establish care and f/u from recent visit to ED    [facility]   Was in the [employer].   [facility].   Colon cancer screening up to date. Had cologuard  PSA up to date per patient.   Immunizations (adacel, shingrix, pneumococcal vaccine) reportedly up to date. Will obtain    Was recently seen at [facility] on 24 for complaint of cough, shortness of breath along with some nasal congestion and sore throat that had been going on for 5 days  CXR done in  ED and showed no acute cardiopulmonary disease  Testing for strep was negative.   His testing for flu/covid and RSV were normal.   CBC showed elevated WBC and CMP showed low K  Lab Results   Component Value Date    WBC 13.76 (H) 07/06/2024    HGB 13.9 07/06/2024    HCT 40.7 07/06/2024    MCV 90 07/06/2024     07/06/2024     Lab Results   Component Value Date    SODIUM 136 07/06/2024    K 3.3 (L) 07/06/2024     07/06/2024    CO2 26 07/06/2024    AGAP 9 07/06/2024    BUN 16 07/06/2024    CREATININE 0.91 07/06/2024    GLUC 102 07/06/2024    CALCIUM 9.6 07/06/2024    AST 15 07/06/2024    ALT 18 07/06/2024    ALKPHOS 48 07/06/2024    TP 7.9 07/06/2024    TBILI 0.76 07/06/2024    EGFR 97 07/06/2024     Was discharged on steroid and albuterol. Completed steroid and still with sx.     Right ear feels clogged. Has pressure right frontal and maxillary regions. Not having any nasal congestion and is not blowing much out but has a lot of PND. No cough.   Shortness of breath better.   No wheeze and has not had use the albuterol in a few days    Review of Systems  Past Medical History:   Diagnosis Date   • Back pain    • Gastroesophageal reflux disease 07/17/2024   • Hypertension    • Migraines    • TBI (traumatic brain injury) (HCC)     in  during [place]. IED   • Tobacco user 07/17/2024    Dec 30, 2020 Entered By: HAYDEE LUIS Comment: quit &gt;5yrs ago, smoked 1 ppd for 26yrs       Past Surgical History:   Procedure Laterality Date   • REVISION OF SCAR ON FACE/HEAD  1998    Reconstruction of face.     Family History   Problem Relation Age of Onset   • Other Mother         covid, low potassium levels   • Alzheimer's disease Mother    • Dementia Mother    • Other Father         covid   • Hypertension Maternal Grandmother    • Hyperlipidemia Maternal Grandmother    • Hypertension Maternal Grandfather    • Hyperlipidemia Maternal Grandfather    • Hypertension Paternal Grandmother    • Hyperlipidemia  Paternal Grandmother    • Hypertension Paternal Grandfather    • Hyperlipidemia Paternal Grandfather      Social History     Tobacco Use   • Smoking status: Former     Current packs/day: 0.00     Average packs/day: 1 pack/day for 12.0 years (12.0 ttl pk-yrs)     Types: Cigarettes     Start date:      Quit date:      Years since quittin.5   • Smokeless tobacco: Never   Vaping Use   • Vaping status: Every Day   • Substances: Nicotine, Flavoring   Substance and Sexual Activity   • Alcohol use: Yes     Comment: 1-2 beers per month   • Drug use: Never   • Sexual activity: Yes     Partners: Female     Current Outpatient Medications on File Prior to Visit   Medication Sig   • albuterol (PROVENTIL HFA,VENTOLIN HFA) 90 mcg/act inhaler Inhale 2 puffs every 4 (four) hours as needed for wheezing or shortness of breath for up to 14 days   • chlorthalidone 25 mg tablet Take 25 mg by mouth daily   • diclofenac sodium (VOLTAREN) 50 mg EC tablet TAKE ONE TABLET BY MOUTH TWICE DAILY AS NEEDED , FOR PAIN AND INFLAMMATION.   • eletriptan (RELPAX) 40 MG tablet Take 40 mg by mouth once as needed for migraine   • Magnesium Oxide -Mg Supplement 420 (252 Mg) MG TABS    • POTASSIUM BICARBONATE PO Take 1,640 mg by mouth daily   • propranolol (INDERAL LA) 60 mg 24 hr capsule Take 60 mg by mouth daily   • topiramate (TOPAMAX) 25 mg tablet Take 25 mg by mouth daily   • traZODone (DESYREL) 50 mg tablet Take 50 mg by mouth daily at bedtime   • [DISCONTINUED] amitriptyline (ELAVIL) 10 mg tablet TAKE TWO TABLETS BY MOUTH AT BEDTIME FOR YOUR MOOD (Patient not taking: Reported on 2024)   • [DISCONTINUED] methylPREDNISolone 4 MG tablet therapy pack Use as directed on package Do not start before 2024. (Patient not taking: Reported on 2024)   • [DISCONTINUED] metoclopramide (REGLAN) 10 mg tablet TAKE ONE TABLET BY MOUTH TWICE DAILY AS NEEDED , FOR ACID REFLUX. (Patient not taking: Reported on 2024)   • [DISCONTINUED]  "naproxen (NAPROSYN) 500 mg tablet Take 1 tablet (500 mg total) by mouth 2 (two) times a day with meals (Patient not taking: Reported on 7/18/2024)   • [DISCONTINUED] ondansetron (ZOFRAN) 4 mg tablet Take 1 tablet (4 mg total) by mouth every 6 (six) hours (Patient not taking: Reported on 7/18/2024)   • [DISCONTINUED] ondansetron (ZOFRAN-ODT) 4 mg disintegrating tablet Take 1 tablet (4 mg total) by mouth every 6 (six) hours as needed for nausea or vomiting (Patient not taking: Reported on 7/18/2024)   • [DISCONTINUED] traMADol (ULTRAM) 50 mg tablet Take 50 mg by mouth every 4 (four) hours as needed (Patient not taking: Reported on 7/18/2024)     Allergies   Allergen Reactions   • Sumatriptan Other (See Comments)     Immunization History   Administered Date(s) Administered   • COVID-19 J&J (Follica) vaccine 0.5 mL 10/15/2021, 11/01/2021   • Tdap 04/12/2014     Objective     /80 (BP Location: Left arm, Patient Position: Sitting, Cuff Size: Adult)   Pulse 68   Temp 97.9 °F (36.6 °C) (Tympanic)   Resp 18   Ht 5' 10.5\" (1.791 m)   Wt 122 kg (270 lb)   SpO2 98%   BMI 38.19 kg/m²     Physical Exam  Vitals and nursing note reviewed.   Constitutional:       General: He is not in acute distress.     Appearance: Normal appearance. He is obese. He is not ill-appearing, toxic-appearing or diaphoretic.   HENT:      Head: Normocephalic and atraumatic.      Left Ear: Tympanic membrane normal.      Ears:      Comments: Right TM is intact. TM is erythematous     Nose: Nose normal.      Mouth/Throat:      Mouth: Mucous membranes are moist.      Pharynx: No oropharyngeal exudate or posterior oropharyngeal erythema.      Comments: Previously visualized vesicles on tonsils appear to have resolved  There is no erythema or exudate  Eyes:      Extraocular Movements: Extraocular movements intact.      Conjunctiva/sclera: Conjunctivae normal.      Pupils: Pupils are equal, round, and reactive to light.   Cardiovascular:      Rate " and Rhythm: Normal rate and regular rhythm.      Heart sounds: No murmur heard.  Pulmonary:      Effort: Pulmonary effort is normal.      Breath sounds: Normal breath sounds. No wheezing, rhonchi or rales.   Musculoskeletal:      Cervical back: Normal range of motion and neck supple.      Right lower leg: No edema.      Left lower leg: No edema.   Lymphadenopathy:      Cervical: Cervical adenopathy (right submandibular adenopathy) present.   Neurological:      Mental Status: He is alert.   Psychiatric:         Mood and Affect: Mood normal.

## 2025-06-27 ENCOUNTER — VBI (OUTPATIENT)
Dept: ADMINISTRATIVE | Facility: OTHER | Age: 50
End: 2025-06-27

## 2025-06-27 NOTE — TELEPHONE ENCOUNTER
06/27/25 3:24 PM    Patient was called to schedule a diabetic follow up apointment ; there was no answer/ a message could not be left.    Thank you.  Kerline Cannon MA  [facility]